# Patient Record
Sex: MALE | Race: BLACK OR AFRICAN AMERICAN | URBAN - NONMETROPOLITAN AREA
[De-identification: names, ages, dates, MRNs, and addresses within clinical notes are randomized per-mention and may not be internally consistent; named-entity substitution may affect disease eponyms.]

---

## 2017-03-10 ENCOUNTER — AMBULATORY - GICH (OUTPATIENT)
Dept: LAB | Facility: OTHER | Age: 22
End: 2017-03-10

## 2017-03-10 DIAGNOSIS — D75.829 HEPARIN INDUCED THROMBOCYTOPENIA (HIT) (H): ICD-10-CM

## 2017-03-10 LAB
ERYTHROCYTE [DISTWIDTH] IN BLOOD BY AUTOMATED COUNT: 16.1 % (ref 11.5–15.5)
HCT VFR BLD AUTO: 48.2 % (ref 37–53)
HEMOGLOBIN: 15.2 G/DL (ref 13.5–17.5)
MCH RBC QN AUTO: 26 PG (ref 26–34)
MCHC RBC AUTO-ENTMCNC: 31.6 G/DL (ref 32–36)
MCV RBC AUTO: 82 FL (ref 80–100)
MISCELLANEOUS SEND OUT - HISTORICAL: NORMAL
MISCELLANEOUS SEND OUT - HISTORICAL: NORMAL
PERFORMING LAB - HISTORICAL: NORMAL
PERFORMING LAB - HISTORICAL: NORMAL
PLATELET # BLD AUTO: 275 THOU/CU MM (ref 140–440)
PMV BLD: 7.2 FL (ref 6.5–11)
RED BLOOD COUNT - HISTORICAL: 5.85 MIL/CU MM (ref 4.3–5.9)
REFERRAL LAB TEST # - HISTORICAL: NORMAL
REFERRAL LAB TEST # - HISTORICAL: NORMAL
SOURCE - HISTORICAL: NORMAL
SOURCE - HISTORICAL: NORMAL
TEST NAME - HISTORICAL: NORMAL
TEST NAME - HISTORICAL: NORMAL
WHITE BLOOD COUNT - HISTORICAL: 5.9 THOU/CU MM (ref 4.5–11)

## 2017-03-14 LAB
Lab: 0.58
Lab: 36 %
Lab: ABNORMAL

## 2017-03-18 ENCOUNTER — AMBULATORY - GICH (OUTPATIENT)
Dept: SCHEDULING | Facility: OTHER | Age: 22
End: 2017-03-18

## 2017-03-18 ENCOUNTER — AMBULATORY - GICH (OUTPATIENT)
Dept: INFUSION THERAPY | Facility: OTHER | Age: 22
End: 2017-03-18

## 2017-03-30 ENCOUNTER — HISTORY (OUTPATIENT)
Dept: ONCOLOGY | Facility: OTHER | Age: 22
End: 2017-03-30

## 2017-03-30 ENCOUNTER — OFFICE VISIT - GICH (OUTPATIENT)
Dept: ONCOLOGY | Facility: OTHER | Age: 22
End: 2017-03-30

## 2017-03-30 DIAGNOSIS — D75.829 HEPARIN INDUCED THROMBOCYTOPENIA (HIT) (H): ICD-10-CM

## 2017-03-30 LAB
A/G RATIO - HISTORICAL: 1.4 (ref 1–2)
ABSOLUTE BASOPHILS - HISTORICAL: 0.1 THOU/CU MM
ABSOLUTE EOSINOPHILS - HISTORICAL: 0.1 THOU/CU MM
ABSOLUTE LYMPHOCYTES - HISTORICAL: 1.8 THOU/CU MM (ref 0.9–2.9)
ABSOLUTE MONOCYTES - HISTORICAL: 0.6 THOU/CU MM
ABSOLUTE NEUTROPHILS - HISTORICAL: 6 THOU/CU MM (ref 1.7–7)
ALBUMIN SERPL-MCNC: 4.8 G/DL (ref 3.5–5.7)
ALP SERPL-CCNC: 175 IU/L (ref 34–104)
ALT (SGPT) - HISTORICAL: 21 IU/L (ref 7–52)
ANION GAP - HISTORICAL: 10 (ref 5–18)
AST SERPL-CCNC: 17 IU/L (ref 13–39)
BASOPHILS # BLD AUTO: 1 %
BILIRUB SERPL-MCNC: 0.5 MG/DL (ref 0.3–1)
BUN SERPL-MCNC: 13 MG/DL (ref 7–25)
BUN/CREAT RATIO - HISTORICAL: 16
CALCIUM SERPL-MCNC: 9.7 MG/DL (ref 8.6–10.3)
CHLORIDE SERPLBLD-SCNC: 103 MMOL/L (ref 98–107)
CO2 SERPL-SCNC: 27 MMOL/L (ref 21–31)
CREAT SERPL-MCNC: 0.79 MG/DL (ref 0.7–1.3)
EOSINOPHIL NFR BLD AUTO: 1.1 %
ERYTHROCYTE [DISTWIDTH] IN BLOOD BY AUTOMATED COUNT: 16.3 % (ref 11.5–15.5)
GFR IF NOT AFRICAN AMERICAN - HISTORICAL: >60 ML/MIN/1.73M2
GLOBULIN - HISTORICAL: 3.4 G/DL (ref 2–3.7)
GLUCOSE SERPL-MCNC: 84 MG/DL (ref 70–105)
HCT VFR BLD AUTO: 46.7 % (ref 37–53)
HEMOGLOBIN: 15 G/DL (ref 13.5–17.5)
LDH SERPL-CCNC: 183 IU/L (ref 140–271)
LYMPHOCYTES NFR BLD AUTO: 21.2 % (ref 20–44)
MCH RBC QN AUTO: 26.7 PG (ref 26–34)
MCHC RBC AUTO-ENTMCNC: 32.2 G/DL (ref 32–36)
MCV RBC AUTO: 83 FL (ref 80–100)
MONOCYTES NFR BLD AUTO: 7.3 %
NEUTROPHILS NFR BLD AUTO: 69.5 % (ref 42–72)
PLATELET # BLD AUTO: 292 THOU/CU MM (ref 140–440)
PMV BLD: 7.1 FL (ref 6.5–11)
POTASSIUM SERPL-SCNC: 4 MMOL/L (ref 3.5–5.1)
PROT SERPL-MCNC: 8.2 G/DL (ref 6.4–8.9)
RED BLOOD COUNT - HISTORICAL: 5.63 MIL/CU MM (ref 4.3–5.9)
SODIUM SERPL-SCNC: 140 MMOL/L (ref 133–143)
WHITE BLOOD COUNT - HISTORICAL: 8.7 THOU/CU MM (ref 4.5–11)

## 2017-05-22 ENCOUNTER — AMBULATORY - GICH (OUTPATIENT)
Dept: LAB | Facility: OTHER | Age: 22
End: 2017-05-22

## 2017-05-22 DIAGNOSIS — D75.829 HEPARIN INDUCED THROMBOCYTOPENIA (HIT) (H): ICD-10-CM

## 2017-05-22 LAB
A/G RATIO - HISTORICAL: 1.3 (ref 1–2)
ABSOLUTE BASOPHILS - HISTORICAL: 0 THOU/CU MM
ABSOLUTE EOSINOPHILS - HISTORICAL: 0.1 THOU/CU MM
ABSOLUTE LYMPHOCYTES - HISTORICAL: 2.4 THOU/CU MM (ref 0.9–2.9)
ABSOLUTE MONOCYTES - HISTORICAL: 0.5 THOU/CU MM
ABSOLUTE NEUTROPHILS - HISTORICAL: 4 THOU/CU MM (ref 1.7–7)
ALBUMIN SERPL-MCNC: 4.4 G/DL (ref 3.5–5.7)
ALP SERPL-CCNC: 214 IU/L (ref 34–104)
ALT (SGPT) - HISTORICAL: 20 IU/L (ref 7–52)
ANION GAP - HISTORICAL: 4 (ref 5–18)
AST SERPL-CCNC: 19 IU/L (ref 13–39)
BASOPHILS # BLD AUTO: 0.3 %
BILIRUB SERPL-MCNC: 0.4 MG/DL (ref 0.3–1)
BUN SERPL-MCNC: 11 MG/DL (ref 7–25)
BUN/CREAT RATIO - HISTORICAL: 15
CALCIUM SERPL-MCNC: 9.3 MG/DL (ref 8.6–10.3)
CHLORIDE SERPLBLD-SCNC: 105 MMOL/L (ref 98–107)
CO2 SERPL-SCNC: 26 MMOL/L (ref 21–31)
CREAT SERPL-MCNC: 0.72 MG/DL (ref 0.7–1.3)
EOSINOPHIL NFR BLD AUTO: 1.4 %
ERYTHROCYTE [DISTWIDTH] IN BLOOD BY AUTOMATED COUNT: 15.2 % (ref 11.5–15.5)
GFR IF NOT AFRICAN AMERICAN - HISTORICAL: >60 ML/MIN/1.73M2
GLOBULIN - HISTORICAL: 3.4 G/DL (ref 2–3.7)
GLUCOSE SERPL-MCNC: 88 MG/DL (ref 70–105)
HCT VFR BLD AUTO: 44.8 % (ref 37–53)
HEMOGLOBIN: 15 G/DL (ref 13.5–17.5)
LDH SERPL-CCNC: 171 IU/L (ref 140–271)
LYMPHOCYTES NFR BLD AUTO: 33.9 % (ref 20–44)
MCH RBC QN AUTO: 27.1 PG (ref 26–34)
MCHC RBC AUTO-ENTMCNC: 33.5 G/DL (ref 32–36)
MCV RBC AUTO: 81 FL (ref 80–100)
MISCELLANEOUS SEND OUT - HISTORICAL: NORMAL
MONOCYTES NFR BLD AUTO: 6.4 %
NEUTROPHILS NFR BLD AUTO: 57.7 % (ref 42–72)
PERFORMING LAB - HISTORICAL: NORMAL
PLATELET # BLD AUTO: 289 THOU/CU MM (ref 140–440)
PMV BLD: 9.3 FL (ref 6.5–11)
POTASSIUM SERPL-SCNC: 3.8 MMOL/L (ref 3.5–5.1)
PROT SERPL-MCNC: 7.8 G/DL (ref 6.4–8.9)
RED BLOOD COUNT - HISTORICAL: 5.54 MIL/CU MM (ref 4.3–5.9)
REFERRAL LAB TEST # - HISTORICAL: NORMAL
SODIUM SERPL-SCNC: 135 MMOL/L (ref 133–143)
SOURCE - HISTORICAL: NORMAL
TEST NAME - HISTORICAL: NORMAL
WHITE BLOOD COUNT - HISTORICAL: 7 THOU/CU MM (ref 4.5–11)

## 2017-05-24 LAB
Lab: 0.39
Lab: NEGATIVE
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
## 2018-01-04 NOTE — NURSING NOTE
Patient Information     Patient Name MRN Sex Harini Queen [id number] Male 1995      Nursing Note by Stefany Ward at 3/30/2017 10:00 AM     Author:  Stefany Ward Service:  (none) Author Type:  NURS- Registered Nurse     Filed:  3/30/2017 11:09 AM Encounter Date:  3/30/2017 Status:  Signed     :  Stefany Ward (NURS- Registered Nurse)            Labs ordered per written order sheet and sent to provider for co-sign. Stefany Ward RN 3/30/2017  11:09 AM

## 2018-01-04 NOTE — PROGRESS NOTES
Patient Information     Patient Name MRN Sex Harini Queen [id number] Male 1995      Progress Notes by Luis Fernando Doe MD at 3/30/2017 10:00 AM     Author:  Luis Fernando Doe MD Service:  (none) Author Type:  Physician     Filed:  3/30/2017  5:19 PM Encounter Date:  3/30/2017 Status:  Signed     :  Luis Fernando Doe MD (Physician)            This note has been dictated. The encounter number is [phone].

## 2018-01-28 VITALS
HEART RATE: 80 BPM | DIASTOLIC BLOOD PRESSURE: 82 MMHG | BODY MASS INDEX: 28.79 KG/M2 | RESPIRATION RATE: 16 BRPM | HEIGHT: 69 IN | TEMPERATURE: 97.3 F | SYSTOLIC BLOOD PRESSURE: 134 MMHG | WEIGHT: 194.4 LBS

## 2018-02-01 ENCOUNTER — DOCUMENTATION ONLY (OUTPATIENT)
Dept: FAMILY MEDICINE | Facility: OTHER | Age: 23
End: 2018-02-01

## 2018-02-01 PROBLEM — D75.829 HIT (HEPARIN-INDUCED THROMBOCYTOPENIA) (H): Status: ACTIVE | Noted: 2017-03-30

## 2018-02-01 RX ORDER — METHOCARBAMOL 500 MG/1
1 TABLET, FILM COATED ORAL AT BEDTIME
COMMUNITY
Start: 2017-03-30

## 2018-02-01 RX ORDER — ASPIRIN 81 MG/1
81 TABLET ORAL
COMMUNITY
Start: 2017-03-30

## 2018-02-01 RX ORDER — METOPROLOL TARTRATE 50 MG
50 TABLET ORAL 2 TIMES DAILY
COMMUNITY
Start: 2017-03-30

## 2018-02-01 RX ORDER — ACETAMINOPHEN 500 MG
TABLET ORAL
COMMUNITY
Start: 2017-03-30